# Patient Record
Sex: MALE | Race: WHITE | NOT HISPANIC OR LATINO | URBAN - METROPOLITAN AREA
[De-identification: names, ages, dates, MRNs, and addresses within clinical notes are randomized per-mention and may not be internally consistent; named-entity substitution may affect disease eponyms.]

---

## 2023-03-17 PROBLEM — R09.82 POST-NASAL DRAINAGE: Status: ACTIVE | Noted: 2023-03-17

## 2023-03-17 PROBLEM — R33.9 URINE RETENTION: Status: ACTIVE | Noted: 2023-03-17

## 2023-03-17 PROBLEM — Z20.822 EXPOSURE TO SEVERE ACUTE RESPIRATORY SYNDROME CORONAVIRUS 2 (SARS-COV-2): Status: ACTIVE | Noted: 2023-03-17

## 2023-03-17 PROBLEM — M25.569 KNEE PAIN: Status: ACTIVE | Noted: 2023-03-17

## 2023-03-17 PROBLEM — H00.015 HORDEOLUM EXTERNUM OF LEFT LOWER EYELID: Status: ACTIVE | Noted: 2023-03-17

## 2023-03-17 PROBLEM — K21.9 GERD WITHOUT ESOPHAGITIS: Status: ACTIVE | Noted: 2023-03-17

## 2023-03-17 PROBLEM — R05.9 COUGH: Status: ACTIVE | Noted: 2023-03-17

## 2023-03-17 PROBLEM — C61 PROSTATE CANCER (MULTI): Status: ACTIVE | Noted: 2023-03-17

## 2023-03-17 PROBLEM — N13.8 BPH WITH OBSTRUCTION/LOWER URINARY TRACT SYMPTOMS: Status: ACTIVE | Noted: 2023-03-17

## 2023-03-17 PROBLEM — N40.1 BPH WITH OBSTRUCTION/LOWER URINARY TRACT SYMPTOMS: Status: ACTIVE | Noted: 2023-03-17

## 2023-03-17 PROBLEM — E78.5 HYPERLIPEMIA: Status: ACTIVE | Noted: 2023-03-17

## 2023-03-17 PROBLEM — R20.2 PARESTHESIA: Status: ACTIVE | Noted: 2023-03-17

## 2023-03-17 PROBLEM — N52.9 ERECTILE DYSFUNCTION: Status: ACTIVE | Noted: 2023-03-17

## 2023-03-17 PROBLEM — S69.91XA THUMB INJURY, RIGHT, INITIAL ENCOUNTER: Status: ACTIVE | Noted: 2023-03-17

## 2023-03-17 PROBLEM — R30.0 DYSURIA: Status: ACTIVE | Noted: 2023-03-17

## 2023-03-17 PROBLEM — R31.0 GROSS HEMATURIA: Status: ACTIVE | Noted: 2023-03-17

## 2023-03-17 PROBLEM — L71.9 ROSACEA: Status: ACTIVE | Noted: 2023-03-17

## 2023-03-17 RX ORDER — TAMSULOSIN HYDROCHLORIDE 0.4 MG/1
1 CAPSULE ORAL DAILY
COMMUNITY
Start: 2022-01-10 | End: 2024-03-21 | Stop reason: SDUPTHER

## 2024-03-21 ENCOUNTER — OFFICE VISIT (OUTPATIENT)
Dept: PRIMARY CARE | Facility: CLINIC | Age: 69
End: 2024-03-21
Payer: COMMERCIAL

## 2024-03-21 VITALS
HEIGHT: 72 IN | RESPIRATION RATE: 16 BRPM | DIASTOLIC BLOOD PRESSURE: 74 MMHG | HEART RATE: 76 BPM | SYSTOLIC BLOOD PRESSURE: 122 MMHG | BODY MASS INDEX: 27.12 KG/M2 | WEIGHT: 200.2 LBS

## 2024-03-21 DIAGNOSIS — E78.2 MIXED HYPERLIPIDEMIA: ICD-10-CM

## 2024-03-21 DIAGNOSIS — Z13.6 SCREENING FOR CARDIOVASCULAR CONDITION: ICD-10-CM

## 2024-03-21 DIAGNOSIS — Z00.00 WELCOME TO MEDICARE PREVENTIVE VISIT: Primary | ICD-10-CM

## 2024-03-21 DIAGNOSIS — R53.83 FATIGUE, UNSPECIFIED TYPE: ICD-10-CM

## 2024-03-21 DIAGNOSIS — K21.9 GERD WITHOUT ESOPHAGITIS: ICD-10-CM

## 2024-03-21 DIAGNOSIS — N40.1 BPH WITH OBSTRUCTION/LOWER URINARY TRACT SYMPTOMS: ICD-10-CM

## 2024-03-21 DIAGNOSIS — Z00.00 ROUTINE GENERAL MEDICAL EXAMINATION AT A HEALTH CARE FACILITY: ICD-10-CM

## 2024-03-21 DIAGNOSIS — N13.8 BPH WITH OBSTRUCTION/LOWER URINARY TRACT SYMPTOMS: ICD-10-CM

## 2024-03-21 DIAGNOSIS — C61 PROSTATE CANCER (MULTI): ICD-10-CM

## 2024-03-21 PROBLEM — R20.2 PARESTHESIA: Status: RESOLVED | Noted: 2023-03-17 | Resolved: 2024-03-21

## 2024-03-21 PROBLEM — Z20.822 EXPOSURE TO SEVERE ACUTE RESPIRATORY SYNDROME CORONAVIRUS 2 (SARS-COV-2): Status: RESOLVED | Noted: 2023-03-17 | Resolved: 2024-03-21

## 2024-03-21 PROBLEM — R05.9 COUGH: Status: RESOLVED | Noted: 2023-03-17 | Resolved: 2024-03-21

## 2024-03-21 PROBLEM — M25.569 KNEE PAIN: Status: RESOLVED | Noted: 2023-03-17 | Resolved: 2024-03-21

## 2024-03-21 PROBLEM — H00.015 HORDEOLUM EXTERNUM OF LEFT LOWER EYELID: Status: RESOLVED | Noted: 2023-03-17 | Resolved: 2024-03-21

## 2024-03-21 PROBLEM — R30.0 DYSURIA: Status: RESOLVED | Noted: 2023-03-17 | Resolved: 2024-03-21

## 2024-03-21 PROBLEM — R31.0 GROSS HEMATURIA: Status: RESOLVED | Noted: 2023-03-17 | Resolved: 2024-03-21

## 2024-03-21 PROBLEM — S69.91XA THUMB INJURY, RIGHT, INITIAL ENCOUNTER: Status: RESOLVED | Noted: 2023-03-17 | Resolved: 2024-03-21

## 2024-03-21 LAB
ALBUMIN SERPL BCP-MCNC: 4.6 G/DL (ref 3.4–5)
ALP SERPL-CCNC: 45 U/L (ref 33–136)
ALT SERPL W P-5'-P-CCNC: 18 U/L (ref 10–52)
AMORPH CRY #/AREA UR COMP ASSIST: ABNORMAL /HPF
ANION GAP SERPL CALC-SCNC: 15 MMOL/L (ref 10–20)
APPEARANCE UR: ABNORMAL
AST SERPL W P-5'-P-CCNC: 21 U/L (ref 9–39)
BILIRUB SERPL-MCNC: 1.5 MG/DL (ref 0–1.2)
BILIRUB UR STRIP.AUTO-MCNC: NEGATIVE MG/DL
BUN SERPL-MCNC: 21 MG/DL (ref 6–23)
CALCIUM SERPL-MCNC: 10 MG/DL (ref 8.6–10.6)
CHLORIDE SERPL-SCNC: 102 MMOL/L (ref 98–107)
CHOLEST SERPL-MCNC: 243 MG/DL (ref 0–199)
CHOLESTEROL/HDL RATIO: 3.8
CO2 SERPL-SCNC: 29 MMOL/L (ref 21–32)
COLOR UR: ABNORMAL
CREAT SERPL-MCNC: 1.05 MG/DL (ref 0.5–1.3)
EGFRCR SERPLBLD CKD-EPI 2021: 77 ML/MIN/1.73M*2
ERYTHROCYTE [DISTWIDTH] IN BLOOD BY AUTOMATED COUNT: 12.8 % (ref 11.5–14.5)
GLUCOSE SERPL-MCNC: 91 MG/DL (ref 74–99)
GLUCOSE UR STRIP.AUTO-MCNC: NORMAL MG/DL
HCT VFR BLD AUTO: 49.7 % (ref 41–52)
HCYS SERPL-SCNC: 14.83 UMOL/L (ref 5–13.9)
HDLC SERPL-MCNC: 63.6 MG/DL
HGB BLD-MCNC: 16.6 G/DL (ref 13.5–17.5)
KETONES UR STRIP.AUTO-MCNC: NEGATIVE MG/DL
LDLC SERPL CALC-MCNC: 155 MG/DL
LEUKOCYTE ESTERASE UR QL STRIP.AUTO: NEGATIVE
MCH RBC QN AUTO: 30.2 PG (ref 26–34)
MCHC RBC AUTO-ENTMCNC: 33.4 G/DL (ref 32–36)
MCV RBC AUTO: 91 FL (ref 80–100)
NITRITE UR QL STRIP.AUTO: NEGATIVE
NON HDL CHOLESTEROL: 179 MG/DL (ref 0–149)
NRBC BLD-RTO: 0 /100 WBCS (ref 0–0)
PH UR STRIP.AUTO: 5 [PH]
PLATELET # BLD AUTO: 216 X10*3/UL (ref 150–450)
POTASSIUM SERPL-SCNC: 4.5 MMOL/L (ref 3.5–5.3)
PROT SERPL-MCNC: 7.4 G/DL (ref 6.4–8.2)
PROT UR STRIP.AUTO-MCNC: ABNORMAL MG/DL
RBC # BLD AUTO: 5.49 X10*6/UL (ref 4.5–5.9)
RBC # UR STRIP.AUTO: NEGATIVE /UL
RBC #/AREA URNS AUTO: ABNORMAL /HPF
SODIUM SERPL-SCNC: 141 MMOL/L (ref 136–145)
SP GR UR STRIP.AUTO: 1.03
TRIGL SERPL-MCNC: 123 MG/DL (ref 0–149)
UROBILINOGEN UR STRIP.AUTO-MCNC: NORMAL MG/DL
VIT B12 SERPL-MCNC: 540 PG/ML (ref 211–911)
VLDL: 25 MG/DL (ref 0–40)
WBC # BLD AUTO: 4 X10*3/UL (ref 4.4–11.3)
WBC #/AREA URNS AUTO: ABNORMAL /HPF

## 2024-03-21 PROCEDURE — 83090 ASSAY OF HOMOCYSTEINE: CPT

## 2024-03-21 PROCEDURE — G0402 INITIAL PREVENTIVE EXAM: HCPCS | Performed by: INTERNAL MEDICINE

## 2024-03-21 PROCEDURE — 99397 PER PM REEVAL EST PAT 65+ YR: CPT | Performed by: INTERNAL MEDICINE

## 2024-03-21 PROCEDURE — 82607 VITAMIN B-12: CPT

## 2024-03-21 PROCEDURE — 80061 LIPID PANEL: CPT

## 2024-03-21 PROCEDURE — G0403 EKG FOR INITIAL PREVENT EXAM: HCPCS | Performed by: INTERNAL MEDICINE

## 2024-03-21 PROCEDURE — 4004F PT TOBACCO SCREEN RCVD TLK: CPT | Performed by: INTERNAL MEDICINE

## 2024-03-21 PROCEDURE — 1160F RVW MEDS BY RX/DR IN RCRD: CPT | Performed by: INTERNAL MEDICINE

## 2024-03-21 PROCEDURE — 1170F FXNL STATUS ASSESSED: CPT | Performed by: INTERNAL MEDICINE

## 2024-03-21 PROCEDURE — 80053 COMPREHEN METABOLIC PANEL: CPT

## 2024-03-21 PROCEDURE — 36415 COLL VENOUS BLD VENIPUNCTURE: CPT

## 2024-03-21 PROCEDURE — 1124F ACP DISCUSS-NO DSCNMKR DOCD: CPT | Performed by: INTERNAL MEDICINE

## 2024-03-21 PROCEDURE — 85027 COMPLETE CBC AUTOMATED: CPT

## 2024-03-21 PROCEDURE — 1159F MED LIST DOCD IN RCRD: CPT | Performed by: INTERNAL MEDICINE

## 2024-03-21 PROCEDURE — 81001 URINALYSIS AUTO W/SCOPE: CPT

## 2024-03-21 RX ORDER — TAMSULOSIN HYDROCHLORIDE 0.4 MG/1
0.4 CAPSULE ORAL DAILY
Qty: 90 CAPSULE | Refills: 3 | Status: SHIPPED | OUTPATIENT
Start: 2024-03-21

## 2024-03-21 RX ORDER — FLUTICASONE PROPIONATE 50 MCG
SPRAY, SUSPENSION (ML) NASAL
COMMUNITY

## 2024-03-21 RX ORDER — OMEPRAZOLE 20 MG/1
1 CAPSULE, DELAYED RELEASE ORAL DAILY
COMMUNITY
Start: 2018-01-02 | End: 2024-03-21 | Stop reason: SDUPTHER

## 2024-03-21 RX ORDER — OMEPRAZOLE 20 MG/1
20 CAPSULE, DELAYED RELEASE ORAL DAILY
Qty: 90 CAPSULE | Refills: 3 | Status: SHIPPED | OUTPATIENT
Start: 2024-03-21

## 2024-03-21 ASSESSMENT — ENCOUNTER SYMPTOMS
SPEECH DIFFICULTY: 0
ADENOPATHY: 0
RHINORRHEA: 0
DECREASED CONCENTRATION: 0
CHOKING: 0
WOUND: 0
WEAKNESS: 0
ARTHRALGIAS: 0
DIFFICULTY URINATING: 0
CONSTIPATION: 0
UNEXPECTED WEIGHT CHANGE: 0
DIAPHORESIS: 0
COUGH: 0
HEADACHES: 0
SHORTNESS OF BREATH: 0
SINUS PRESSURE: 0
HYPERACTIVE: 0
MYALGIAS: 0
ROS GI COMMENTS: +GERD
NECK STIFFNESS: 0
NECK PAIN: 0
EYE PAIN: 0
ABDOMINAL PAIN: 0
CONFUSION: 0
POLYDIPSIA: 0
APNEA: 0
APPETITE CHANGE: 0
LIGHT-HEADEDNESS: 0
PALPITATIONS: 0
TROUBLE SWALLOWING: 0
AGITATION: 0
NERVOUS/ANXIOUS: 0
SORE THROAT: 0
FEVER: 0
CHEST TIGHTNESS: 0
DYSURIA: 0
HALLUCINATIONS: 0
SINUS PAIN: 0
TREMORS: 0
CHILLS: 0
ACTIVITY CHANGE: 0
NUMBNESS: 0
VOICE CHANGE: 0
STRIDOR: 0
DYSPHORIC MOOD: 0
RECTAL PAIN: 0
BACK PAIN: 0
POLYPHAGIA: 0
NAUSEA: 0
HEMATURIA: 0
VOMITING: 0
EYE DISCHARGE: 0
EYE ITCHING: 0
BRUISES/BLEEDS EASILY: 0
PHOTOPHOBIA: 0
SEIZURES: 0
DIARRHEA: 0
EYE REDNESS: 0
DIZZINESS: 0
FACIAL ASYMMETRY: 0
WHEEZING: 0
BLOOD IN STOOL: 0
FACIAL SWELLING: 0
FLANK PAIN: 0
SLEEP DISTURBANCE: 1
JOINT SWELLING: 0
FATIGUE: 0
ABDOMINAL DISTENTION: 0
COLOR CHANGE: 0
ANAL BLEEDING: 0
FREQUENCY: 0

## 2024-03-21 ASSESSMENT — ACTIVITIES OF DAILY LIVING (ADL)
MANAGING_FINANCES: INDEPENDENT
BATHING: INDEPENDENT
GROCERY_SHOPPING: INDEPENDENT
DOING_HOUSEWORK: INDEPENDENT
TAKING_MEDICATION: INDEPENDENT
DRESSING: INDEPENDENT

## 2024-03-21 ASSESSMENT — PATIENT HEALTH QUESTIONNAIRE - PHQ9
SUM OF ALL RESPONSES TO PHQ9 QUESTIONS 1 AND 2: 0
1. LITTLE INTEREST OR PLEASURE IN DOING THINGS: NOT AT ALL
2. FEELING DOWN, DEPRESSED OR HOPELESS: NOT AT ALL

## 2024-03-21 NOTE — PATIENT INSTRUCTIONS
We did blood work today and will contact you if anything is abnormal/different from previous labs  Continue medications as directed; refills provided   Continue healthy diet-low saturated fats, lean protein, veggies, healthy oils/fat  Exercise regularly-goal is 150 minutes/week   Consider statin due to elevated calcium score which indicates plaque in the 4 major arteries and increases your risk for cardiovascular events (stroke/heart attack)  Continue to see urology yearly for history of prostate cancer-PSA was ok in 10/2023  Snoring alone is not indicator for sleep apnea; you may have occasional apneic spells but if feeling overall rested and not having fatigue during day/sleepiness this is not likely to be the case   Follow up here in 1 year-sooner if needed

## 2024-03-21 NOTE — PROGRESS NOTES
Subjective   Reason for Visit: Dandre Pierre is an 68 y.o. male here for a Medicare Wellness visit.     Past Medical, Surgical, and Family History reviewed and updated in chart.    Reviewed all medications by prescribing practitioner or clinical pharmacist (such as prescriptions, OTCs, herbal therapies and supplements) and documented in the medical record.    HPI    Pt here to reestablish.  Last seen in 2021.  Has been seeing another provider through [facility].  He left due to not being able to talk to the physican/office.    No major changes in his weight-his diet is good but he only hikes occasionally for exercise.      He is here today more to discuss possibility of sleep apnea.  He snores and at times wakes up gasping.      Had colonoscopy in 2016.  Noted to be normal.  He will repeat in 2026.  No bowel issues.  He declines FIT testing.  He has GERD treated with omeprazole.      History of prostate cancer/BPH.  He continues to see urology once a year and they follow PSA.  He had the seed placement for treatment.  He is on Flomax to help with retention/weak stream.      He sees derm and had full body check recently.      He has history of HLP and has been encouraged to start statins but he refuses.  He did have a Ct cardiac score in 11/2023 that was 353.  He did have labs at that time LDL was 148 HDL 53.      Patient Care Team:  Micaela SANCHES DO as PCP - General     Review of Systems   Constitutional:  Negative for activity change, appetite change, chills, diaphoresis, fatigue, fever and unexpected weight change.   HENT:  Positive for hearing loss. Negative for congestion, dental problem, drooling, ear discharge, ear pain, facial swelling, mouth sores, nosebleeds, postnasal drip, rhinorrhea, sinus pressure, sinus pain, sneezing, sore throat, tinnitus, trouble swallowing and voice change.    Eyes:  Positive for visual disturbance (wears readers-up to date on exam). Negative for photophobia, pain, discharge, redness  and itching.   Respiratory:  Negative for apnea, cough, choking, chest tightness, shortness of breath, wheezing and stridor.    Cardiovascular:  Negative for chest pain, palpitations and leg swelling.   Gastrointestinal:  Negative for abdominal distention, abdominal pain, anal bleeding, blood in stool, constipation, diarrhea, nausea, rectal pain and vomiting.        +GERD    Endocrine: Negative for cold intolerance, heat intolerance, polydipsia, polyphagia and polyuria.   Genitourinary:  Negative for decreased urine volume, difficulty urinating, dysuria, enuresis, flank pain, frequency, genital sores, hematuria, penile discharge, penile pain, penile swelling, scrotal swelling, testicular pain and urgency.        Weak stream    Musculoskeletal:  Negative for arthralgias, back pain, gait problem, joint swelling, myalgias, neck pain and neck stiffness.   Skin:  Negative for color change, pallor, rash and wound.   Allergic/Immunologic: Positive for environmental allergies. Negative for food allergies and immunocompromised state.   Neurological:  Negative for dizziness, tremors, seizures, syncope, facial asymmetry, speech difficulty, weakness, light-headedness, numbness and headaches.   Hematological:  Negative for adenopathy. Does not bruise/bleed easily.   Psychiatric/Behavioral:  Positive for sleep disturbance. Negative for agitation, behavioral problems, confusion, decreased concentration, dysphoric mood, hallucinations, self-injury and suicidal ideas. The patient is not nervous/anxious and is not hyperactive.        Objective   Vitals:  /74 (BP Location: Left arm, Patient Position: Sitting)   Pulse 76   Resp 16   Ht 1.829 m (6')   Wt 90.8 kg (200 lb 3.2 oz)   BMI 27.15 kg/m²       Physical Exam  Constitutional:       Appearance: Normal appearance.   HENT:      Head: Normocephalic and atraumatic.      Right Ear: Tympanic membrane, ear canal and external ear normal. There is no impacted cerumen.      Left  Ear: Tympanic membrane, ear canal and external ear normal. There is no impacted cerumen.      Nose: Nose normal. No congestion or rhinorrhea.      Mouth/Throat:      Mouth: Mucous membranes are moist.      Pharynx: Oropharynx is clear. No oropharyngeal exudate or posterior oropharyngeal erythema.   Eyes:      Extraocular Movements: Extraocular movements intact.      Conjunctiva/sclera: Conjunctivae normal.      Pupils: Pupils are equal, round, and reactive to light.   Neck:      Vascular: No carotid bruit.   Cardiovascular:      Rate and Rhythm: Normal rate and regular rhythm.      Pulses: Normal pulses.      Heart sounds: Normal heart sounds. No murmur heard.  Pulmonary:      Effort: Pulmonary effort is normal. No respiratory distress.      Breath sounds: Normal breath sounds. No wheezing, rhonchi or rales.   Abdominal:      General: Abdomen is flat. Bowel sounds are normal. There is no distension.      Palpations: Abdomen is soft.      Tenderness: There is no abdominal tenderness.      Hernia: No hernia is present.   Musculoskeletal:         General: No swelling or tenderness. Normal range of motion.      Cervical back: Normal range of motion and neck supple.      Right lower leg: No edema.      Left lower leg: No edema.   Lymphadenopathy:      Cervical: No cervical adenopathy.   Skin:     General: Skin is warm and dry.      Findings: No lesion or rash.   Neurological:      General: No focal deficit present.      Mental Status: He is alert and oriented to person, place, and time.      Cranial Nerves: No cranial nerve deficit.      Sensory: No sensory deficit.      Motor: No weakness.   Psychiatric:         Mood and Affect: Mood normal.         Behavior: Behavior normal.         Thought Content: Thought content normal.         Judgment: Judgment normal.         Assessment/Plan   Problem List Items Addressed This Visit       BPH with obstruction/lower urinary tract symptoms    Current Assessment & Plan     On  Flomax-refill provided  Sees urology yearly          Relevant Medications    tamsulosin (Flomax) 0.4 mg 24 hr capsule    Other Relevant Orders    Urinalysis with Reflex Microscopic    GERD without esophagitis    Current Assessment & Plan     On PPI therapy with good control  Refill provided  Diet modifications         Relevant Medications    omeprazole (PriLOSEC) 20 mg DR capsule    Other Relevant Orders    Comprehensive Metabolic Panel    CBC    Hyperlipemia    Current Assessment & Plan     Pt with positive CT cardiac score  He declines statin therapy  He understands risks of leaving this untreated included but not limited to cardiovascular events          Relevant Orders    Comprehensive Metabolic Panel    Lipid Panel    Homocysteine, serum    Prostate cancer (CMS/[facility])    Current Assessment & Plan     S/p seed insertion  Per pt sees urology yearly  PSA noted to be ok in 10/2023          Relevant Orders    CBC     Other Visit Diagnoses       Welcome to Medicare preventive visit    -  Primary    Routine general medical examination at a health care facility        Relevant Orders    Follow Up In Primary Care - Medicare Annual    Fatigue, unspecified type        Relevant Orders    Vitamin B12    Screening for cardiovascular condition        Relevant Orders    ECG 12 lead (Completed)

## 2024-03-21 NOTE — ASSESSMENT & PLAN NOTE
Pt with positive CT cardiac score  He declines statin therapy  He understands risks of leaving this untreated included but not limited to cardiovascular events

## 2024-09-19 ENCOUNTER — TELEPHONE (OUTPATIENT)
Dept: PRIMARY CARE | Facility: CLINIC | Age: 69
End: 2024-09-19
Payer: COMMERCIAL

## 2024-09-27 ENCOUNTER — APPOINTMENT (OUTPATIENT)
Dept: PRIMARY CARE | Facility: CLINIC | Age: 69
End: 2024-09-27
Payer: COMMERCIAL

## 2024-09-27 VITALS
DIASTOLIC BLOOD PRESSURE: 81 MMHG | OXYGEN SATURATION: 95 % | BODY MASS INDEX: 26.84 KG/M2 | SYSTOLIC BLOOD PRESSURE: 125 MMHG | RESPIRATION RATE: 16 BRPM | HEART RATE: 61 BPM | WEIGHT: 197.9 LBS

## 2024-09-27 DIAGNOSIS — R68.89 EXERCISE INTOLERANCE: ICD-10-CM

## 2024-09-27 DIAGNOSIS — R93.1 ABNORMAL CARDIAC CT ANGIOGRAPHY: ICD-10-CM

## 2024-09-27 DIAGNOSIS — I20.0 UNSTABLE ANGINA PECTORIS (MULTI): Primary | ICD-10-CM

## 2024-09-27 DIAGNOSIS — E78.2 MIXED HYPERLIPIDEMIA: ICD-10-CM

## 2024-09-27 PROCEDURE — 99214 OFFICE O/P EST MOD 30 MIN: CPT | Performed by: INTERNAL MEDICINE

## 2024-09-27 PROCEDURE — 1159F MED LIST DOCD IN RCRD: CPT | Performed by: INTERNAL MEDICINE

## 2024-09-27 RX ORDER — ROSUVASTATIN CALCIUM 10 MG/1
10 TABLET, COATED ORAL DAILY
Qty: 90 TABLET | Refills: 1 | Status: SHIPPED | OUTPATIENT
Start: 2024-09-27 | End: 2025-11-01

## 2024-09-27 ASSESSMENT — ENCOUNTER SYMPTOMS
CHEST TIGHTNESS: 0
PALPITATIONS: 0
CHOKING: 0
ARTHRALGIAS: 0
COUGH: 0
STRIDOR: 0
JOINT SWELLING: 0
DIZZINESS: 0
FATIGUE: 0
WHEEZING: 0
LIGHT-HEADEDNESS: 0
APNEA: 0
SHORTNESS OF BREATH: 1

## 2024-09-27 NOTE — PROGRESS NOTES
Subjective   Patient ID: Dandre Pierre is a 69 y.o. male who presents for Follow-up (Questions about medication ).    HPI  Pt here in acute visit.  Per patient he was concerned about his heart.  He tells me one year ago he was told he has heart disease.  He is not currently exercising.  His diet is high in meat but he avoids fried foods.  He eats predominately at home.      He had a positive CT cardiac score in 11/2023 that was 353.  He was then given an rx for Crestor and baby ASA.  He never took these as he didn't understand why he needed them. His LDL in March was 155.      He only smokes cigars periodically but no cigarette smoking.  He drinks alcohol regularly-3 drinks/day.  No family history of heart disease.  He does not have diabetes or HTN.  He notes when cutting grass or exerting himself he can feel winded but otherwise no chest pain.      Review of Systems   Constitutional:  Negative for fatigue.   Respiratory:  Positive for shortness of breath. Negative for apnea, cough, choking, chest tightness, wheezing and stridor.    Cardiovascular:  Negative for chest pain, palpitations and leg swelling.   Musculoskeletal:  Negative for arthralgias and joint swelling.   Neurological:  Negative for dizziness and light-headedness.       Objective   /81 (BP Location: Left arm, Patient Position: Sitting)   Pulse 61   Resp 16   Wt 89.8 kg (197 lb 14.4 oz)   SpO2 95%   BMI 26.84 kg/m²    Physical Exam  Constitutional:       Appearance: Normal appearance.   Cardiovascular:      Rate and Rhythm: Normal rate and regular rhythm.      Heart sounds: Normal heart sounds.   Pulmonary:      Effort: Pulmonary effort is normal.      Breath sounds: Normal breath sounds.   Musculoskeletal:      Right lower leg: No edema.      Left lower leg: No edema.   Neurological:      Mental Status: He is alert and oriented to person, place, and time.   Psychiatric:         Mood and Affect: Mood normal.         Behavior: Behavior  normal.         Thought Content: Thought content normal.         Judgment: Judgment normal.         Assessment/Plan   Problem List Items Addressed This Visit       Hyperlipemia    Relevant Medications    rosuvastatin (Crestor) 10 mg tablet    Other Relevant Orders    Follow Up In Primary Care - Established    Abnormal cardiac CT angiography     Pt had CT cardiac through [facility] and other provider back in 11/2023  It was over 350 at the time  He was advised to start crestor 20 mg and ASA 81 but he never did and never followed up  Today he wishes to discuss these findings and rationale for tx   Explained in detail need to lessen further plaque formation and to help thin blood to get past current  plaqued filled arteries  He seems more amendable to starting tx and we compromised with using a lower dose statin to lessen chance of side effects  Offered cardio consult but he wishes to wait on this  Was willing to do stress test due to some symptoms of exercise intolerance and possibly unstable angina          Relevant Orders    Echocardiogram Stress Test    Follow Up In Primary Care - Established     Other Visit Diagnoses       Unstable angina pectoris (Multi)    -  Primary    Relevant Orders    Echocardiogram Stress Test    Exercise intolerance        Relevant Orders    Echocardiogram Stress Test

## 2024-09-27 NOTE — ASSESSMENT & PLAN NOTE
Pt had CT cardiac through [facility] and other provider back in 11/2023  It was over 350 at the time  He was advised to start crestor 20 mg and ASA 81 but he never did and never followed up  Today he wishes to discuss these findings and rationale for tx   Explained in detail need to lessen further plaque formation and to help thin blood to get past current  plaqued filled arteries  He seems more amendable to starting tx and we compromised with using a lower dose statin to lessen chance of side effects  Offered cardio consult but he wishes to wait on this  Was willing to do stress test due to some symptoms of exercise intolerance and possibly unstable angina

## 2024-09-27 NOTE — PATIENT INSTRUCTIONS
Start rosuvastatin (Crestor) 10 mg daily (better taken in evening/bedtime in case of side effects)  Start baby Aspirin 81 mg-can get this over the counter and take 1 a day  Call and schedule stress test to further assess heart   Work on healthy diet-lean protein (less meat), fish, veggies, more plant based  Exercise regularly with goal of 150 minutes/week-unless you note chest pain during exercise then stop immediately and let us know   Follow up here in 3 months (come fasting for repeat blood work)

## 2024-10-02 ENCOUNTER — HOSPITAL ENCOUNTER (OUTPATIENT)
Dept: CARDIOLOGY | Facility: HOSPITAL | Age: 69
Discharge: HOME | End: 2024-10-02
Payer: COMMERCIAL

## 2024-10-02 DIAGNOSIS — R93.1 ABNORMAL CARDIAC CT ANGIOGRAPHY: ICD-10-CM

## 2024-10-02 DIAGNOSIS — I20.0 UNSTABLE ANGINA PECTORIS (MULTI): ICD-10-CM

## 2024-10-02 DIAGNOSIS — R68.89 EXERCISE INTOLERANCE: ICD-10-CM

## 2024-10-02 PROCEDURE — 93018 CV STRESS TEST I&R ONLY: CPT | Performed by: STUDENT IN AN ORGANIZED HEALTH CARE EDUCATION/TRAINING PROGRAM

## 2024-10-02 PROCEDURE — 93017 CV STRESS TEST TRACING ONLY: CPT

## 2024-10-02 PROCEDURE — 93350 STRESS TTE ONLY: CPT | Performed by: STUDENT IN AN ORGANIZED HEALTH CARE EDUCATION/TRAINING PROGRAM

## 2024-10-02 PROCEDURE — 93016 CV STRESS TEST SUPVJ ONLY: CPT | Performed by: STUDENT IN AN ORGANIZED HEALTH CARE EDUCATION/TRAINING PROGRAM

## 2024-10-03 ENCOUNTER — TELEPHONE (OUTPATIENT)
Dept: PRIMARY CARE | Facility: CLINIC | Age: 69
End: 2024-10-03
Payer: COMMERCIAL

## 2024-10-03 DIAGNOSIS — R93.1 ABNORMAL CARDIAC CT ANGIOGRAPHY: Primary | ICD-10-CM

## 2024-10-03 NOTE — TELEPHONE ENCOUNTER
Called and informed patient on results patient is agreeable to see cardio, are you able to put a referral in to cardiology? Thank you.

## 2024-10-03 NOTE — TELEPHONE ENCOUNTER
----- Message from Micaela Orantes sent at 10/3/2024  7:22 AM EDT -----  Pts stress test did show some abnormalities but overall the heart function was noted to be normal, I would still strongly encourage him see cardiology due to the CT cardiac score showing moderate levels of plaque and the slight abnormalities noted in the stress test.  Want their opinion on whether more testing is needed to prevent an event

## 2024-12-30 ENCOUNTER — TELEPHONE (OUTPATIENT)
Dept: PRIMARY CARE | Facility: CLINIC | Age: 69
End: 2024-12-30

## 2024-12-30 ENCOUNTER — LAB (OUTPATIENT)
Dept: LAB | Facility: LAB | Age: 69
End: 2024-12-30
Payer: COMMERCIAL

## 2024-12-30 ENCOUNTER — APPOINTMENT (OUTPATIENT)
Dept: PRIMARY CARE | Facility: CLINIC | Age: 69
End: 2024-12-30
Payer: COMMERCIAL

## 2024-12-30 VITALS
BODY MASS INDEX: 27.49 KG/M2 | HEART RATE: 59 BPM | OXYGEN SATURATION: 93 % | RESPIRATION RATE: 16 BRPM | WEIGHT: 202.7 LBS | DIASTOLIC BLOOD PRESSURE: 81 MMHG | SYSTOLIC BLOOD PRESSURE: 119 MMHG

## 2024-12-30 DIAGNOSIS — Z12.5 SCREENING FOR PROSTATE CANCER: ICD-10-CM

## 2024-12-30 DIAGNOSIS — N13.8 BPH WITH OBSTRUCTION/LOWER URINARY TRACT SYMPTOMS: ICD-10-CM

## 2024-12-30 DIAGNOSIS — N40.1 BPH WITH OBSTRUCTION/LOWER URINARY TRACT SYMPTOMS: ICD-10-CM

## 2024-12-30 DIAGNOSIS — Z12.5 SCREENING FOR PROSTATE CANCER: Primary | ICD-10-CM

## 2024-12-30 DIAGNOSIS — E78.2 MIXED HYPERLIPIDEMIA: ICD-10-CM

## 2024-12-30 DIAGNOSIS — C61 PROSTATE CANCER (MULTI): ICD-10-CM

## 2024-12-30 DIAGNOSIS — R93.1 ABNORMAL CARDIAC CT ANGIOGRAPHY: ICD-10-CM

## 2024-12-30 LAB
ANION GAP SERPL CALC-SCNC: 12 MMOL/L (ref 10–20)
BUN SERPL-MCNC: 17 MG/DL (ref 6–23)
CALCIUM SERPL-MCNC: 9.6 MG/DL (ref 8.6–10.6)
CHLORIDE SERPL-SCNC: 101 MMOL/L (ref 98–107)
CHOLEST SERPL-MCNC: 237 MG/DL (ref 0–199)
CHOLESTEROL/HDL RATIO: 4
CO2 SERPL-SCNC: 29 MMOL/L (ref 21–32)
CREAT SERPL-MCNC: 0.98 MG/DL (ref 0.5–1.3)
EGFRCR SERPLBLD CKD-EPI 2021: 83 ML/MIN/1.73M*2
GLUCOSE SERPL-MCNC: 100 MG/DL (ref 74–99)
HDLC SERPL-MCNC: 59.2 MG/DL
LDLC SERPL CALC-MCNC: 150 MG/DL
NON HDL CHOLESTEROL: 178 MG/DL (ref 0–149)
POTASSIUM SERPL-SCNC: 4.4 MMOL/L (ref 3.5–5.3)
PSA SERPL-MCNC: <0.1 NG/ML
SODIUM SERPL-SCNC: 138 MMOL/L (ref 136–145)
TRIGL SERPL-MCNC: 138 MG/DL (ref 0–149)
VLDL: 28 MG/DL (ref 0–40)

## 2024-12-30 PROCEDURE — 80061 LIPID PANEL: CPT

## 2024-12-30 PROCEDURE — 1160F RVW MEDS BY RX/DR IN RCRD: CPT | Performed by: INTERNAL MEDICINE

## 2024-12-30 PROCEDURE — 80048 BASIC METABOLIC PNL TOTAL CA: CPT

## 2024-12-30 PROCEDURE — G0103 PSA SCREENING: HCPCS

## 2024-12-30 PROCEDURE — 99214 OFFICE O/P EST MOD 30 MIN: CPT | Performed by: INTERNAL MEDICINE

## 2024-12-30 PROCEDURE — 1159F MED LIST DOCD IN RCRD: CPT | Performed by: INTERNAL MEDICINE

## 2024-12-30 ASSESSMENT — ENCOUNTER SYMPTOMS
SHORTNESS OF BREATH: 0
CHEST TIGHTNESS: 0
DIFFICULTY URINATING: 0
FREQUENCY: 0
DIZZINESS: 0
DYSURIA: 0
HEADACHES: 0
LIGHT-HEADEDNESS: 0
HEMATURIA: 0
CHOKING: 0
STRIDOR: 0
COUGH: 0
FEVER: 0
PALPITATIONS: 0
APNEA: 0
CHILLS: 0
WHEEZING: 0
FATIGUE: 0
FLANK PAIN: 0

## 2024-12-30 NOTE — ASSESSMENT & PLAN NOTE
Pt not on ASA or statin therapy  Didn't like how he felt on statin after 1 week of use and read somewhere that stopping ASA acutely once you start it can be fatal  Tried to explain to patient this is not the case and that statin therapy we can try a different one/lower dose but he seems uninterested  Wants repeat lipids done today on herbal he is on   Encouraged cardiology consult to discuss further but he is reluctant and for now not interested  He understands risk of having known plaque and not treating this adequately (including massive MI that could cause death)

## 2024-12-30 NOTE — PROGRESS NOTES
Subjective   Patient ID: Dandre Pierre is a 69 y.o. male who presents for Follow-up (3 month ).    HPI  Pt here in follow up to cardiac stress test.  He stopped his crestor after 1 week due to pain in joints-per patient.  He also didn't go see cardiology as directed after his stress test.  He is taking a vitamin guided towards heart health.  He is also on tumeric now.    He would like some blood work done today.  He is no longer seeing urology.  Last PSA was in 10/2023-he would like this repeated.      Review of Systems   Constitutional:  Negative for chills, fatigue and fever.   Respiratory:  Negative for apnea, cough, choking, chest tightness, shortness of breath, wheezing and stridor.    Cardiovascular:  Negative for chest pain, palpitations and leg swelling.   Genitourinary:  Negative for decreased urine volume, difficulty urinating, dysuria, flank pain, frequency, hematuria and urgency.   Neurological:  Negative for dizziness, light-headedness and headaches.       Objective   /81 (BP Location: Right arm, Patient Position: Sitting)   Pulse 59   Resp 16   Wt 91.9 kg (202 lb 11.2 oz)   SpO2 93%   BMI 27.49 kg/m²      Physical Exam  Constitutional:       Appearance: Normal appearance.   Cardiovascular:      Rate and Rhythm: Normal rate and regular rhythm.      Heart sounds: Normal heart sounds.   Pulmonary:      Effort: Pulmonary effort is normal.      Breath sounds: Normal breath sounds.   Abdominal:      General: Bowel sounds are normal.      Palpations: Abdomen is soft.   Musculoskeletal:      Right lower leg: No edema.      Left lower leg: No edema.   Lymphadenopathy:      Cervical: No cervical adenopathy.   Neurological:      Mental Status: He is alert and oriented to person, place, and time.   Psychiatric:         Mood and Affect: Mood normal.         Assessment/Plan   Problem List Items Addressed This Visit       BPH with obstruction/lower urinary tract symptoms    Relevant Orders    Basic  Metabolic Panel    Follow Up In Primary Care - Medicare Annual    Hyperlipemia    Relevant Orders    Lipid Panel    Follow Up In Primary Care - Medicare Annual    Prostate cancer (Multi)     Pt not seeing urology  Will do PSA          Relevant Orders    Basic Metabolic Panel    Follow Up In Primary Care - Medicare Annual    Abnormal cardiac CT angiography     Pt not on ASA or statin therapy  Didn't like how he felt on statin after 1 week of use and read somewhere that stopping ASA acutely once you start it can be fatal  Tried to explain to patient this is not the case and that statin therapy we can try a different one/lower dose but he seems uninterested  Wants repeat lipids done today on herbal he is on   Encouraged cardiology consult to discuss further but he is reluctant and for now not interested  He understands risk of having known plaque and not treating this adequately (including massive MI that could cause death)         Relevant Orders    Follow Up In Primary Care - Medicare Annual     Other Visit Diagnoses       Screening for prostate cancer    -  Primary    Relevant Orders    Prostate Specific Antigen    Follow Up In Primary Care - Medicare Annual

## 2024-12-30 NOTE — PATIENT INSTRUCTIONS
Consider taking lower dose rosuvastatin 5 mg and if able to tolerate take daily-if not able to tolerate can either do every other day dosing or we try a different one  Strongly consider seeing cardiology as we know you have moderate amounts of plaque in your heart arteries and while your stress test was ok at that time you are still at risk for a heart event/heart attack  Treatment of choice is statin therapy (lipid lowering) as well as blood thinning (baby aspirin) to help  Get blood work done today and we will call if abnormal  Follow up for physical after March

## 2024-12-31 ENCOUNTER — TELEPHONE (OUTPATIENT)
Dept: PRIMARY CARE | Facility: CLINIC | Age: 69
End: 2024-12-31
Payer: COMMERCIAL

## 2024-12-31 DIAGNOSIS — E78.2 MIXED HYPERLIPIDEMIA: Primary | ICD-10-CM

## 2024-12-31 RX ORDER — ATORVASTATIN CALCIUM 10 MG/1
10 TABLET, FILM COATED ORAL DAILY
Qty: 100 TABLET | Refills: 3 | Status: SHIPPED | OUTPATIENT
Start: 2024-12-31 | End: 2026-02-04

## 2024-12-31 NOTE — TELEPHONE ENCOUNTER
Sent in atorvastatin 10 mg to take at night-see if this lessens the joint pain/side effects he had; he can also take every other day for 4 doses/week if he prefers

## 2025-01-15 ENCOUNTER — APPOINTMENT (OUTPATIENT)
Dept: CARDIOLOGY | Facility: CLINIC | Age: 70
End: 2025-01-15
Payer: MEDICARE

## 2025-01-15 VITALS
BODY MASS INDEX: 27.58 KG/M2 | OXYGEN SATURATION: 96 % | WEIGHT: 203.6 LBS | HEART RATE: 72 BPM | DIASTOLIC BLOOD PRESSURE: 84 MMHG | HEIGHT: 72 IN | SYSTOLIC BLOOD PRESSURE: 138 MMHG

## 2025-01-15 DIAGNOSIS — R93.1 ELEVATED CORONARY ARTERY CALCIUM SCORE: ICD-10-CM

## 2025-01-15 DIAGNOSIS — E78.2 MIXED HYPERLIPIDEMIA: Primary | ICD-10-CM

## 2025-01-15 DIAGNOSIS — R93.1 ABNORMAL CARDIAC CT ANGIOGRAPHY: ICD-10-CM

## 2025-01-15 PROBLEM — R33.9 URINE RETENTION: Status: RESOLVED | Noted: 2023-03-17 | Resolved: 2025-01-15

## 2025-01-15 PROBLEM — N18.2 STAGE 2 CHRONIC KIDNEY DISEASE: Status: ACTIVE | Noted: 2023-10-19

## 2025-01-15 PROBLEM — F17.290 CIGAR SMOKER: Status: ACTIVE | Noted: 2022-10-11

## 2025-01-15 PROCEDURE — 99204 OFFICE O/P NEW MOD 45 MIN: CPT | Performed by: PHYSICIAN ASSISTANT

## 2025-01-15 PROCEDURE — 3008F BODY MASS INDEX DOCD: CPT | Performed by: PHYSICIAN ASSISTANT

## 2025-01-15 PROCEDURE — 1159F MED LIST DOCD IN RCRD: CPT | Performed by: PHYSICIAN ASSISTANT

## 2025-01-15 RX ORDER — NAPROXEN SODIUM 220 MG/1
81 TABLET, FILM COATED ORAL DAILY
Qty: 30 TABLET | Refills: 11 | Status: SHIPPED | OUTPATIENT
Start: 2025-01-15 | End: 2026-01-15

## 2025-01-15 NOTE — PROGRESS NOTES
Chief Complaint:   Establish Care (Abnormal cardio ct angiography)     History Of Present Illness:    Dandre Pierre is a 69 y.o. male presenting with elevated CAC score.  Patient recently completed an exercise stress echo displaying no stress induced wall motion abnormalities, however with 1mm inferior ST depression at peak workload - overall deemed a normal stress echo for ischemia.  Patient subsequently completed CAC scoring which revealed moderate subclinical coronary disease at 353 Agatston units.  Patient is agreeable to initiate ASA 81mg 4x weekly, however refuses statin therapy despite LDL-C figures above recommended range.  Patient denies chest pain, chest pressure, palpitations, dyspnea on exertion, shortness of breath at rest, diaphoresis, nausea/vomiting, back pain, headache, lightheadedness, dizziness, syncope or presyncopal episodes, active bleeding signs or symptoms, excessive weight gain, muscle or joint pain, claudication.       Last Recorded Vitals:  Vitals:    01/15/25 1526   BP: 138/84   BP Location: Left arm   Patient Position: Sitting   BP Cuff Size: Adult   Pulse: 72   SpO2: 96%   Weight: 92.4 kg (203 lb 9.6 oz)   Height: 1.829 m (6')       Past Medical History:  He has a past medical history of Personal history of irradiation (11/05/2019), Personal history of other diseases of the nervous system and sense organs (12/14/2018), and Unspecified acute conjunctivitis, right eye (12/10/2018).    Past Surgical History:  He has a past surgical history that includes CT angio coronary art with heartflow if score >30% (11/20/2023) and US guided biopsy prostate.      Social History:  He reports that he has been smoking cigars. He has never used smokeless tobacco. He reports current alcohol use. He reports that he does not currently use drugs.    Family History:  Family History   Problem Relation Name Age of Onset    Alzheimer's disease Mother      Lung cancer Father      ALS Sister      Down syndrome  Daughter          Allergies:  Grass pollen    Outpatient Medications:  Current Outpatient Medications   Medication Instructions    atorvastatin (LIPITOR) 10 mg, oral, Daily    fluticasone (Flonase Allergy Relief) 50 mcg/actuation nasal spray Administer into affected nostril(s).    omeprazole (PRILOSEC) 20 mg, oral, Daily    tamsulosin (FLOMAX) 0.4 mg, oral, Daily       Physical Exam:  Constitutional: awake and alert, oriented ×3, no apparent distress  Skin: warm, dry, good turgor no obvious lesions  Eyes: pupils equal, round, reactive to light, conjunctiva pink and noninjected, no discharge  HENT: normocephalic and atraumatic, mucous membranes moist, trachea midline with no masses/goiter  Cardiovascular: S1/S2 regular, no murmur no rubs/gallops, no carotid bruits, no JVD  Pulmonary: symmetrical chest expansion, lungs are clear to auscultation bilaterally, no wheezes/rales/rhonchi, normal effort  Abdomen: nontender, nondistended, active bowel sounds, no ascites  Extremities: no cyanosis, clubbing, no LE edema no lesions; palpable pedal pulses  Neurologic: cranial nerves II - XII grossly intact, stable gait, no tremor       Last Labs:  CBC -  Lab Results   Component Value Date    WBC 4.0 (L) 03/21/2024    HGB 16.6 03/21/2024    HCT 49.7 03/21/2024    MCV 91 03/21/2024     03/21/2024       CMP -  Lab Results   Component Value Date    CALCIUM 9.6 12/30/2024    PROT 7.4 03/21/2024    ALBUMIN 4.6 03/21/2024    AST 21 03/21/2024    ALT 18 03/21/2024    ALKPHOS 45 03/21/2024    BILITOT 1.5 (H) 03/21/2024       LIPID PANEL -   Lab Results   Component Value Date    CHOL 237 (H) 12/30/2024    TRIG 138 12/30/2024    HDL 59.2 12/30/2024    CHHDL 4.0 12/30/2024    LDLF 178 (H) 01/26/2021    VLDL 28 12/30/2024    NHDL 178 (H) 12/30/2024       RENAL FUNCTION PANEL -   Lab Results   Component Value Date    GLUCOSE 100 (H) 12/30/2024     12/30/2024    K 4.4 12/30/2024     12/30/2024    CO2 29 12/30/2024    ANIONGAP  "12 12/30/2024    BUN 17 12/30/2024    CREATININE 0.98 12/30/2024    CALCIUM 9.6 12/30/2024    ALBUMIN 4.6 03/21/2024        No results found for: \"BNP\", \"HGBA1C\"    Last Cardiology Tests:  ECG:  ECG 12 lead 03/21/2024      Echo:  Echocardiogram Stress Test 10/02/2024      Ejection Fractions:  No results found for: \"EF\"    Cath:  No results found for this or any previous visit from the past 1095 days.      Stress Test:  No results found for this or any previous visit from the past 1095 days.      Cardiac Imaging:  CT angio coronary art with heartflow if score >30% 11/20/2023      Assessment/Plan   Problem List Items Addressed This Visit             ICD-10-CM       Cardiac and Vasculature    Hyperlipidemia - Primary E78.5    Abnormal cardiac CT angiography R93.1    Relevant Medications    aspirin 81 mg chewable tablet    Elevated coronary artery calcium score R93.1       -Would recommend lipid reduction however patient not amenable to this     -ASA 81mg 4x weekly    -No additional testing required at this point in the setting of normal stress echocardiogram    -F/U on an as needed basis      Billy Burgess PA-C  "

## 2025-01-16 PROBLEM — R93.1 ELEVATED CORONARY ARTERY CALCIUM SCORE: Status: ACTIVE | Noted: 2025-01-16

## 2025-03-04 ENCOUNTER — APPOINTMENT (OUTPATIENT)
Dept: PRIMARY CARE | Facility: CLINIC | Age: 70
End: 2025-03-04
Payer: MEDICARE

## 2025-03-12 DIAGNOSIS — N40.1 BPH WITH OBSTRUCTION/LOWER URINARY TRACT SYMPTOMS: ICD-10-CM

## 2025-03-12 DIAGNOSIS — N13.8 BPH WITH OBSTRUCTION/LOWER URINARY TRACT SYMPTOMS: ICD-10-CM

## 2025-03-12 RX ORDER — TAMSULOSIN HYDROCHLORIDE 0.4 MG/1
0.4 CAPSULE ORAL DAILY
Qty: 90 CAPSULE | Refills: 1 | Status: SHIPPED | OUTPATIENT
Start: 2025-03-12

## 2025-03-25 ENCOUNTER — APPOINTMENT (OUTPATIENT)
Dept: PRIMARY CARE | Facility: CLINIC | Age: 70
End: 2025-03-25
Payer: COMMERCIAL

## 2025-04-01 ENCOUNTER — APPOINTMENT (OUTPATIENT)
Dept: PRIMARY CARE | Facility: CLINIC | Age: 70
End: 2025-04-01
Payer: MEDICARE

## 2025-09-04 DIAGNOSIS — N13.8 BPH WITH OBSTRUCTION/LOWER URINARY TRACT SYMPTOMS: ICD-10-CM

## 2025-09-04 DIAGNOSIS — N40.1 BPH WITH OBSTRUCTION/LOWER URINARY TRACT SYMPTOMS: ICD-10-CM

## 2025-09-04 RX ORDER — TAMSULOSIN HYDROCHLORIDE 0.4 MG/1
0.4 CAPSULE ORAL DAILY
Qty: 90 CAPSULE | Refills: 1 | Status: SHIPPED | OUTPATIENT
Start: 2025-09-04